# Patient Record
(demographics unavailable — no encounter records)

---

## 2025-01-16 NOTE — HISTORY OF PRESENT ILLNESS
[de-identified] : 3 year old female presents for evaluation of nasal obstruction. Parents state that Tabitha was seen by an ENT 1 year ago with 70% adenoid obstruction. Notes mouth breathing during sleep. No witnessed apneas. Used floanse, no help. Occasional mouth breathing and rhinorrhea during the day. Has tried Flonase in the past - uses intermittently, no noticable improvement.  Concerned that breathing is affecting weight gain / growth. No recently treated ENT infections.  Speaking full sentences. No parental concerns with hearing.  Born FT without complications. Passed NBHT.

## 2025-01-16 NOTE — CONSULT LETTER
[Dear  ___] : Dear  [unfilled], [Please see my note below.] : Please see my note below. [Consult Closing:] : Thank you very much for allowing me to participate in the care of this patient.  If you have any questions, please do not hesitate to contact me. [Sincerely,] : Sincerely, [Consult Letter:] : I had the pleasure of evaluating your patient, [unfilled]. [FreeTextEntry2] : Dr. Tevin Shrestha [FreeTextEntry3] : Jonah Lewis MD, PhD Chief, Division of Laryngology Department of Otolaryngology [facility], [facility]  of Otolaryngology [facility]

## 2025-01-16 NOTE — REASON FOR VISIT
[Initial Evaluation] : an initial evaluation for [Parents] : parents [FreeTextEntry2] : nasal obstruction

## 2025-01-16 NOTE — ADDENDUM
[FreeTextEntry1] :   Documented by Barrie Srinivasan acting as scribe for Dr. Lewis on 01/16/2025. All Medical record entries made by the Scribe were at my, Dr. Lewis, direction and personally dictated by me on 01/16/2025 . I have reviewed the chart and agree that the record accurately reflects my personal performance of the history, physical exam, assessment and plan. I have also personally directed, reviewed, and agreed with the discharge instructions.

## 2025-01-16 NOTE — DATA REVIEWED
[FreeTextEntry1] : Audiogram ordered to assess for sensorineural +/- conductive hearing loss Results: Hearing WNL AU

## 2025-01-16 NOTE — PHYSICAL EXAM
[Clear to Auscultation] : lungs were clear to auscultation bilaterally [Normal Gait and Station] : normal gait and station [Normal muscle strength, symmetry and tone of facial, head and neck musculature] : normal muscle strength, symmetry and tone of facial, head and neck musculature [Normal] : no cervical lymphadenopathy [Complete] : complete cerumen impaction [Effusion] : effusion [2+] : 2+ [Exposed Vessel] : left anterior vessel not exposed [Wheezing] : no wheezing [Increased Work of Breathing] : no increased work of breathing with use of accessory muscles and retractions

## 2025-04-24 NOTE — CONSULT LETTER
[Dear  ___] : Dear  [unfilled], [Please see my note below.] : Please see my note below. [Consult Closing:] : Thank you very much for allowing me to participate in the care of this patient.  If you have any questions, please do not hesitate to contact me. [Sincerely,] : Sincerely, [Courtesy Letter:] : I had the pleasure of seeing your patient, [unfilled], in my office today. [FreeTextEntry2] : Dr. Tevin Shrestha [FreeTextEntry3] : Jonah Lewis MD, PhD Chief, Division of Laryngology Department of Otolaryngology [facility] Pediatric Otolaryngology, [facility]  of Otolaryngology [facility]

## 2025-04-24 NOTE — HISTORY OF PRESENT ILLNESS
[de-identified] : 4 year old female presents for evaluation of nasal obstruction. Now s/p adenoidectomy and myringotomy aspiration.  Breathing is better since the procedure. Still mouth breathing.  using the Flonase.  Denies dysphagia or choking.  No vpi sx's  Still restless sleep.  Seasonal allergies have been bothering recently.

## 2025-04-24 NOTE — HISTORY OF PRESENT ILLNESS
[de-identified] : 4 year old female presents for evaluation of nasal obstruction. Now s/p adenoidectomy and myringotomy aspiration.  Breathing is better since the procedure. Still mouth breathing.  using the Flonase.  Denies dysphagia or choking.  No vpi sx's  Still restless sleep.  Seasonal allergies have been bothering recently.

## 2025-04-24 NOTE — PHYSICAL EXAM
[2+] : 2+ [Clear to Auscultation] : lungs were clear to auscultation bilaterally [Normal Gait and Station] : normal gait and station [Normal muscle strength, symmetry and tone of facial, head and neck musculature] : normal muscle strength, symmetry and tone of facial, head and neck musculature [Normal] : the left membrane was normal [Effusion] : no effusion [Exposed Vessel] : left anterior vessel not exposed [Wheezing] : no wheezing [Increased Work of Breathing] : no increased work of breathing with use of accessory muscles and retractions

## 2025-04-24 NOTE — ADDENDUM
[FreeTextEntry1] :   Documented by Barrie Srinivasan acting as scribe for Dr. Lewis on 04/24/2025. All Medical record entries made by the Scribe were at my, Dr. Lewis, direction and personally dictated by me on 04/24/2025 . I have reviewed the chart and agree that the record accurately reflects my personal performance of the history, physical exam, assessment and plan. I have also personally directed, reviewed, and agreed with the discharge instructions.

## 2025-04-24 NOTE — HISTORY OF PRESENT ILLNESS
[de-identified] : 4 year old female presents for evaluation of nasal obstruction. Now s/p adenoidectomy and myringotomy aspiration.  Breathing is better since the procedure. Still mouth breathing.  using the Flonase.  Denies dysphagia or choking.  No vpi sx's  Still restless sleep.  Seasonal allergies have been bothering recently.

## 2025-04-24 NOTE — CONSULT LETTER
[Dear  ___] : Dear  [unfilled], [Please see my note below.] : Please see my note below. [Consult Closing:] : Thank you very much for allowing me to participate in the care of this patient.  If you have any questions, please do not hesitate to contact me. [Sincerely,] : Sincerely, [Courtesy Letter:] : I had the pleasure of seeing your patient, [unfilled], in my office today. [FreeTextEntry2] : Dr. Tevin Shrestha [FreeTextEntry3] : Jonah Lewis MD, PhD Chief, Division of Laryngology Department of Otolaryngology [facility] Pediatric Otolaryngology, [facility]  of Otolaryngology [facility]

## 2025-04-24 NOTE — CONSULT LETTER
[Dear  ___] : Dear  [unfilled], [Please see my note below.] : Please see my note below. [Consult Closing:] : Thank you very much for allowing me to participate in the care of this patient.  If you have any questions, please do not hesitate to contact me. [Sincerely,] : Sincerely, [Courtesy Letter:] : I had the pleasure of seeing your patient, [unfilled], in my office today. [FreeTextEntry2] : Dr. Tevin Shrestha [FreeTextEntry3] : Jonah Lewis MD, PhD Chief, Division of Laryngology Department of Otolaryngology [facility] Pediatric Otolaryngology, [facility]  of Otolaryngology [facility]

## 2025-04-24 NOTE — REASON FOR VISIT
[Parents] : parents [Post-Operative Visit] : a post-operative visit for [FreeTextEntry2] : otitis media